# Patient Record
Sex: FEMALE | Race: WHITE | Employment: FULL TIME | URBAN - METROPOLITAN AREA
[De-identification: names, ages, dates, MRNs, and addresses within clinical notes are randomized per-mention and may not be internally consistent; named-entity substitution may affect disease eponyms.]

---

## 2020-02-20 ENCOUNTER — HOSPITAL ENCOUNTER (OUTPATIENT)
Dept: LAB | Age: 30
Discharge: HOME | End: 2020-02-20
Payer: COMMERCIAL

## 2020-02-20 ENCOUNTER — OFFICE VISIT (OUTPATIENT)
Dept: OBGYN CLINIC | Age: 30
End: 2020-02-20

## 2020-02-20 VITALS
BODY MASS INDEX: 23.64 KG/M2 | WEIGHT: 156 LBS | SYSTOLIC BLOOD PRESSURE: 109 MMHG | RESPIRATION RATE: 22 BRPM | HEIGHT: 68 IN | DIASTOLIC BLOOD PRESSURE: 65 MMHG | HEART RATE: 91 BPM | OXYGEN SATURATION: 99 %

## 2020-02-20 DIAGNOSIS — Z01.419 WELL WOMAN EXAM WITH ROUTINE GYNECOLOGICAL EXAM: Primary | ICD-10-CM

## 2020-02-20 DIAGNOSIS — N91.2 AMENORRHEA: ICD-10-CM

## 2020-02-20 DIAGNOSIS — Z01.419 WELL WOMAN EXAM WITH ROUTINE GYNECOLOGICAL EXAM: ICD-10-CM

## 2020-02-20 LAB — HCG SERPL-ACNC: <1 MIU/ML (ref 0–10)

## 2020-02-20 PROCEDURE — 87624 HPV HI-RISK TYP POOLED RSLT: CPT

## 2020-02-20 PROCEDURE — 87340 HEPATITIS B SURFACE AG IA: CPT

## 2020-02-20 PROCEDURE — 36415 COLL VENOUS BLD VENIPUNCTURE: CPT

## 2020-02-20 PROCEDURE — 84702 CHORIONIC GONADOTROPIN TEST: CPT

## 2020-02-20 PROCEDURE — 87661 TRICHOMONAS VAGINALIS AMPLIF: CPT

## 2020-02-20 PROCEDURE — 86803 HEPATITIS C AB TEST: CPT

## 2020-02-20 PROCEDURE — 88175 CYTOPATH C/V AUTO FLUID REDO: CPT

## 2020-02-20 PROCEDURE — 87389 HIV-1 AG W/HIV-1&-2 AB AG IA: CPT

## 2020-02-20 PROCEDURE — 86780 TREPONEMA PALLIDUM: CPT

## 2020-02-20 NOTE — PROGRESS NOTES
1. Have you been to the ER, urgent care clinic since your last visit? Hospitalized since your last visit? No    2. Have you seen or consulted any other health care providers outside of the [address] [place] [address] since your last visit? Include any pap smears or colon screening.  No     Visit Vitals  /65   Pulse 91   Resp 22   Ht 5' 8\" (1.727 m)   Wt 156 lb (70.8 kg)   SpO2 99%   BMI 23.72 kg/m²

## 2020-02-21 LAB
C TRACH RRNA SPEC QL NAA+PROBE: NEGATIVE
HBV SURFACE AG SER QL: <0.1 INDEX
HBV SURFACE AG SER QL: NEGATIVE
HCV AB SER IA-ACNC: 0.12 INDEX
HCV AB SERPL QL IA: NEGATIVE
HCV COMMENT,HCGAC: NORMAL
HIV 1+2 AB+HIV1 P24 AG SERPL QL IA: NONREACTIVE
HIV12 RESULT COMMENT, HHIVC: NORMAL
N GONORRHOEA RRNA SPEC QL NAA+PROBE: NEGATIVE
SPECIMEN SOURCE: ABNORMAL
T PALLIDUM AB SER QL IA: NONREACTIVE
T VAGINALIS RRNA SPEC QL NAA+PROBE: POSITIVE

## 2020-02-22 NOTE — PROGRESS NOTES
SUBJECTIVE:   27 y.o. female for annual routine Pap and checkup. Current Outpatient Medications   Medication Sig Dispense Refill    cloNIDine HCl (CATAPRES) 0.1 mg tablet   0    QUEtiapine (SEROQUEL) 300 mg tablet   0    sertraline (ZOLOFT) 100 mg tablet Take  by mouth daily. Allergies: Percocet [oxycodone-acetaminophen]   No LMP recorded. ROS:  Feeling well. No dyspnea or chest pain on exertion. No abdominal pain, change in bowel habits, black or bloody stools. No urinary tract symptoms. GYN ROS: normal menses, no abnormal bleeding, pelvic pain or discharge, no breast pain or new or enlarging lumps on self exam. No neurological complaints. OBJECTIVE:   The patient appears well, alert, oriented x 3, in no distress. Visit Vitals  /65   Pulse 91   Resp 22   Ht 5' 8\" (1.727 m)   Wt 156 lb (70.8 kg)   SpO2 99%   BMI 23.72 kg/m²         BREAST EXAM: breasts appear normal, no suspicious masses, no skin or nipple changes or axillary nodes    PELVIC EXAM: normal external genitalia, vulva, vagina, cervix, uterus and adnexa    ASSESSMENT:   Well woman.  Patient just recovering from a pneumothorax    PLAN:   Pap smear  STD testing both cultures and blood work  Would like beta hcg quantitative to confirm that she is not pregnant

## 2020-02-24 DIAGNOSIS — N76.0 ACUTE VAGINITIS: Primary | ICD-10-CM

## 2020-02-24 RX ORDER — TINIDAZOLE 500 MG/1
2 TABLET ORAL
Qty: 8 TAB | Refills: 0 | Status: SHIPPED | OUTPATIENT
Start: 2020-02-24 | End: 2020-02-26

## 2020-02-24 NOTE — PROGRESS NOTES
I called patient and informed her that her Trichomonas swab was positive and that she needed to take four pills and her partner needed to take four pills and that they are to refrain from intercourse for two weeks. She is then to return for a NAYE in 4 weeks from the day she takes her medication.  The tinidazole was ordered to the pharmacy

## 2020-02-26 ENCOUNTER — TELEPHONE (OUTPATIENT)
Dept: OBGYN CLINIC | Age: 30
End: 2020-02-26

## 2020-02-28 NOTE — TELEPHONE ENCOUNTER
I have attempted to contact this patient by phone with the following results: unable to leave message to return my call.